# Patient Record
Sex: MALE | Race: WHITE | NOT HISPANIC OR LATINO | Employment: UNEMPLOYED | URBAN - METROPOLITAN AREA
[De-identification: names, ages, dates, MRNs, and addresses within clinical notes are randomized per-mention and may not be internally consistent; named-entity substitution may affect disease eponyms.]

---

## 2019-04-04 ENCOUNTER — HOSPITAL ENCOUNTER (OUTPATIENT)
Dept: OTHER | Facility: HOSPITAL | Age: 39
Discharge: HOME | End: 2019-04-04
Attending: NURSE PRACTITIONER

## 2019-12-18 ENCOUNTER — HOSPITAL ENCOUNTER (OUTPATIENT)
Dept: OTHER | Facility: HOSPITAL | Age: 39
Discharge: HOME | End: 2019-12-18
Attending: FAMILY MEDICINE

## 2020-07-07 ENCOUNTER — OFFICE VISIT CONVERTED (OUTPATIENT)
Dept: GASTROENTEROLOGY | Facility: CLINIC | Age: 40
End: 2020-07-07
Attending: NURSE PRACTITIONER

## 2020-07-14 ENCOUNTER — HOSPITAL ENCOUNTER (OUTPATIENT)
Dept: ULTRASOUND IMAGING | Facility: HOSPITAL | Age: 40
Discharge: HOME | End: 2020-07-14
Attending: NURSE PRACTITIONER

## 2020-07-14 LAB
ALBUMIN SERPL-MCNC: 4.2 G/DL (ref 3.5–5)
ALBUMIN/GLOB SERPL: 1.4 {RATIO} (ref 1.4–2.6)
ALP SERPL-CCNC: 81 U/L (ref 53–128)
ALT SERPL-CCNC: 63 U/L (ref 10–40)
ANION GAP SERPL CALC-SCNC: 16 MMOL/L (ref 8–19)
AST SERPL-CCNC: 46 U/L (ref 15–50)
BILIRUB SERPL-MCNC: 0.91 MG/DL (ref 0.2–1.3)
BUN SERPL-MCNC: 14 MG/DL (ref 5–25)
BUN/CREAT SERPL: 16 {RATIO} (ref 6–20)
CALCIUM SERPL-MCNC: 9.5 MG/DL (ref 8.7–10.4)
CHLORIDE SERPL-SCNC: 105 MMOL/L (ref 99–111)
CONV CO2: 23 MMOL/L (ref 22–32)
CONV TOTAL PROTEIN: 7.1 G/DL (ref 6.3–8.2)
CREAT UR-MCNC: 0.89 MG/DL (ref 0.7–1.2)
FERRITIN SERPL-MCNC: 452 NG/ML (ref 30–300)
GFR SERPLBLD BASED ON 1.73 SQ M-ARVRAT: >60 ML/MIN/{1.73_M2}
GLOBULIN UR ELPH-MCNC: 2.9 G/DL (ref 2–3.5)
GLUCOSE SERPL-MCNC: 91 MG/DL (ref 70–99)
INR PPP: 0.99 (ref 2–3)
IRON SATN MFR SERPL: 29 % (ref 20–55)
IRON SERPL-MCNC: 93 UG/DL (ref 70–180)
OSMOLALITY SERPL CALC.SUM OF ELEC: 290 MOSM/KG (ref 273–304)
POTASSIUM SERPL-SCNC: 4.2 MMOL/L (ref 3.5–5.3)
PROTHROMBIN TIME: 10.7 S (ref 9.4–12)
SODIUM SERPL-SCNC: 140 MMOL/L (ref 135–147)
TIBC SERPL-MCNC: 317 UG/DL (ref 245–450)
TRANSFERRIN SERPL-MCNC: 222 MG/DL (ref 215–365)

## 2020-07-15 LAB
CONV HEPATITIS B SURFACE AG W CONFIRMATION RE: NEGATIVE
CONV IMMUNOGLOBULIN G (IGG): 1022 MG/DL (ref 603–1613)
CONV IMMUNOGLOBULIN M (IGM): 97 MG/DL (ref 20–172)
DEPRECATED MITOCHONDRIA M2 IGG SER-ACNC: <20 UNITS (ref 0–20)
HAV IGM SERPL QL IA: NEGATIVE
HBV CORE IGM SERPL QL IA: NEGATIVE
HCV AB SER DONR QL: <0.1 S/CO RATIO (ref 0–0.9)
IGA SERPL-MCNC: 400 MG/DL (ref 90–386)
PROT PATTERN SERPL IFE-IMP: ABNORMAL
SMOOTH MUSCLE F-ACTIN AB IGG: 10 UNITS (ref 0–19)

## 2020-07-16 LAB
DSDNA AB SER-ACNC: NEGATIVE [IU]/ML
ENA AB SER IA-ACNC: NEGATIVE {RATIO}

## 2020-07-17 LAB
A1AT SERPL-MCNC: 154 MG/DL (ref 95–164)
CONV NASH FIBROSURE: NORMAL
PHENOTYPE: NORMAL

## 2020-10-07 ENCOUNTER — OFFICE VISIT CONVERTED (OUTPATIENT)
Dept: GASTROENTEROLOGY | Facility: CLINIC | Age: 40
End: 2020-10-07
Attending: NURSE PRACTITIONER

## 2021-05-10 NOTE — H&P
History and Physical      Patient Name: Stepan Jurado   Patient ID: [id number]   Sex: Male   YOB: 1980    Primary Care Provider: Jesi Villavicencio   Referring Provider: Jesi Villavicencio    Visit Date: July 7, 2020    Provider: TRISTAN Olguin   Location: [facility]   Location Address: [address] [place] [address], [address]  [place], [place]  [id number]   Location Phone: [phone]          Chief Complaint  · Elevated LFTs      History Of Present Illness  The patient is a 39 year old male who presents on referral from Jesi Villavicencio for a gastroenterology evaluation.      Patient admits to drinking 4-5 beers nightly for the last 5 years. Denies unprofessional tattoos, hx of iv/intranasal drug use, or blood transfusion.     Denies abdominal pain, nausea, vomiting, change in appetite, or weight loss.     CMP 7/1/2020: GFR greater than 60, total bilirubin 1.1, alkaline phosphatase 87, AST 73, .  CBC 7/1/2020: WBC 8.2, hemoglobin 16.4, hematocrit 46.8, platelets 180.       Medication List  carbidopa-levodopa  mg oral tablet; cyclobenzaprine 10 mg oral tablet; losartan 50 mg oral tablet; meloxicam 15 mg oral tablet; ropinirole 0.5 mg oral tablet         Allergy List  NO KNOWN DRUG ALLERGIES       Allergies Reconciled  Family Medical History  No family history of colorectal cancer         Social History  Alcohol (Current every day); Tobacco (Current every day)         Review of Systems  · Constitutional  o Denies  o : chills, fever  · Eyes  o Denies  o : blurred vision, changes in vision  · Cardiovascular  o Denies  o : chest pain, syncope  · Respiratory  o Denies  o : shortness of breath, dry cough  · Gastrointestinal  o Admits  o : See HPI  · Genitourinary  o Denies  o : dysuria, blood in urine  · Integument  o Denies  o : rash, new skin lesions  · Neurologic  o Denies  o : altered mental status, tingling or numbness  · Musculoskeletal  o Denies  o : joint pain,  limitation of motion  · Endocrine  o Denies  o : weight gain, weight loss  · Psychiatric  o Denies  o : anxiety, depression      Physical Examination  · Constitutional  o Appearance  o : well developed, well-nourished, in no acute distress  · Eyes  o Vision  o :   § Visual Fields  § : eyes move symmetrical in all directions  o Sclerae  o : anicteric  o Pupils and Irises  o : pupils equal and symmetrical  · Neck  o Inspection/Palpation  o : supple  · Respiratory  o Respiratory Effort  o : breathing unlabored  o Inspection of Chest  o : normal appearance, no retractions  o Auscultation of Lungs  o : clear to auscultation bilaterally  · Cardiovascular  o Heart  o :   § Auscultation of Heart  § : no murmurs, gallops or rubs  · Gastrointestinal  o Abdominal Examination  o : soft, nontender to palpation, with normal active bowel sounds, no appreciable hepatosplenomegaly  o Digital Rectal Exam  o : deferred  · Lymphatic  o Neck  o : no palpable lymphadenopathy  · Skin and Subcutaneous Tissue  o General Inspection  o : without focal lesions; turgor is normal  · Psychiatric  o General  o : Alert and oriented x3  o Mood and Affect  o : Mood and affect are appropriate to circumstances          Assessment  · Elevated liver enzymes     790.5/R74.8    Problems Reconciled  Plan  · Orders  o CMP [facility] (70528) - - 07/07/2020  o RUQ US (right upper quadrant ultrasound) (51706) - - 07/07/2020  o Iron + transferrin (TIBC, calculated % saturation) (39603) - - 07/07/2020  o Ferritin ser/plas (37745) - - 07/07/2020  o ANTONIO (antinuclear antibody profile) by enzyme immunoassay (46292) - - 07/07/2020  o Anti-mitochondrial antibody assay (39386) - - 07/07/2020  o Anti-Smooth Muscle Antibody (ASMA) [facility] (57452) - - 07/07/2020  o Serum immunofixation electrophoresis (42363) - - 07/07/2020  o Alpha-1-Antitrypsin/Phenotype [facility] (31777, 94843) - - 07/07/2020  o PT (17924) - - 07/07/2020  o Acute hepatitis panel (HAV IgM, HbcAb IgM, HbsAg, HCV) (59262,  98339, 78198, 80585, 46176) - - 07/07/2020  o BAPTISTE Fibrosure [facility] (drawn at [facility] only and must be fasting 8 hours; requires HT and WT) (03113, 41577, 88204, 24732, 47481, 27962, 49986, 18525, 70194, 48185) - - 07/07/2020  · Medications  o Medications have been Reconciled  o Transition of Care or Provider Policy            Electronically Signed by: TRISTAN Olguin -Author on July 7, 2020 08:28:06 AM

## 2021-05-13 NOTE — PROGRESS NOTES
Progress Note      Patient Name: Stepan Jurado   Patient ID: [id number]   Sex: Male   YOB: 1980    Primary Care Provider: Jesi Villavicencio   Referring Provider: Jesi Villavicencio    Visit Date: October 7, 2020    Provider: TRISTAN Olguin   Location: [facility]   Location Address: [address] [place] [address], [address]  [place], [place]  [id number]   Location Phone: [phone]          Chief Complaint  · Follow up Elevated LFTs      History Of Present Illness     Patient presents today for follow-up liver work-up.  Fibro-sure revealed mild fatty liver and stage 1-2 fibrosis. He did also have an elevated ferritin, however has yet to obtain HFE gene. Iron levels normal.   Patient states that he is still drinking several beers throughout the week but understands he needs to quit.      He denies any abdominal pain, vomiting, change in appetite, or weight loss.     Andrade fibro-sure 7/7/2020: F1 to 2, S2, N1.    7/14/2020: GFR greater than 20, alkaline phosphatase 81, ALT 63, AST 46, total bilirubin 0.91.                Medication List  carbidopa-levodopa  mg oral tablet; cyclobenzaprine 10 mg oral tablet; losartan 50 mg oral tablet; meloxicam 15 mg oral tablet; ropinirole 0.5 mg oral tablet         Allergy List  NO KNOWN DRUG ALLERGIES       Allergies Reconciled  Family Medical History  No family history of colorectal cancer         Social History  Alcohol (Current every day); Tobacco (Current every day)         Review of Systems  · Constitutional  o Denies  o : chills, fever  · Cardiovascular  o Denies  o : chest pain, dyspnea on exertion  · Respiratory  o Denies  o : cough, shortness of breath  · Gastrointestinal  o Admits  o : see HPI   · Endocrine  o Denies  o : weight gain, weight loss      Vitals  Date Time BP Position Site L\R Cuff Size HR RR TEMP (F) WT  HT  BMI kg/m2 BSA m2 O2 Sat FR L/min FiO2 HC       10/07/2020 08:23 /79 Sitting    94 - R   155lbs 0oz  "5'  7\" 24.28 1.82             Physical Examination  · Constitutional  o Appearance  o : Healthy-appearing, awake and alert in no acute distress  · Head and Face  o Head  o : Normocephalic with no worriesome skin lesions  · Eyes  o Vision  o :   § Visual Fields  § : eyes move symmetrical in all directions  o Sclerae  o : sclerae anicteric  o Pupils and Irises  o : pupils equal and symmetrical  · Neck  o Inspection/Palpation  o : Trachea is midline, no adenopathy  · Respiratory  o Respiratory Effort  o : Breathing is unlabored.  o Inspection of Chest  o : normal appearance  o Auscultation of Lungs  o : Chest is clear to auscultation bilaterally.  · Cardiovascular  o Heart  o :   § Auscultation of Heart  § : no murmurs, rubs, or gallops  o Peripheral Vascular System  o :   § Extremities  § : no cyanosis, clubbing or edema;   · Gastrointestinal  o Abdominal Examination  o : Abdomen is soft, nontender to palpation, with normal active bowel sounds, no appreciable hepatosplenomegaly.  o Digital Rectal Exam  o : deferred  · Skin and Subcutaneous Tissue  o General Inspection  o : without focal lesions; turgor is normal  · Psychiatric  o General  o : Alert and oriented x3  o Mood and Affect  o : Mood and affect are appropriate to circumstances          Assessment  · Fatty liver     571.8/K76.0  · Liver fibrosis     571.5/K74.0  · Elevated ferritin     790.6/R79.89      Plan  · Orders  o HFE gene C282Y mutation detection (87320) - - 10/07/2020  · Medications  o Medications have been Reconciled  o Transition of Care or Provider Policy  · Instructions  o Information given on current diagnoses.  o Lifestyle modifications discussed.  o Liver work-up consistent with mild fatty liver and grade 1-2 stiffening. Educated patient that continuing to drink alcohol will increase likelihood of liver stiffening to advance. Educated him about alcohol cessation. Offered him resources for alcohol cessation however he declined at this time and " stated he did not need it.  o Will follow-up in 1 year with a liver ultrasound to make sure stiffening is not advanced. However encouraged patient to please call office if he is having any abdominal pain, abdominal bloating/swelling, rectal bleeding, etc.  o Electronically Identified Patient Education Materials Provided Electronically  · Disposition  o 1 year f/u            Electronically Signed by: TRISTAN Olguin -Author on October 7, 2020 08:47:09 AM

## 2021-05-14 VITALS
WEIGHT: 155 LBS | BODY MASS INDEX: 24.33 KG/M2 | DIASTOLIC BLOOD PRESSURE: 79 MMHG | HEART RATE: 94 BPM | HEIGHT: 67 IN | SYSTOLIC BLOOD PRESSURE: 123 MMHG

## 2021-05-15 VITALS
DIASTOLIC BLOOD PRESSURE: 74 MMHG | HEART RATE: 91 BPM | TEMPERATURE: 98.2 F | WEIGHT: 156.25 LBS | HEIGHT: 67 IN | SYSTOLIC BLOOD PRESSURE: 126 MMHG | BODY MASS INDEX: 24.52 KG/M2

## 2024-07-19 LAB
BASOPHILS # BLD AUTO: 0.1 10*3/MM3 (ref 0–0.2)
BASOPHILS NFR BLD AUTO: 0.8 % (ref 0–1.5)
DEPRECATED RDW RBC AUTO: 45.1 FL (ref 37–54)
EOSINOPHIL # BLD AUTO: 0.44 10*3/MM3 (ref 0–0.4)
EOSINOPHIL NFR BLD AUTO: 3.4 % (ref 0.3–6.2)
ERYTHROCYTE [DISTWIDTH] IN BLOOD BY AUTOMATED COUNT: 12.7 % (ref 12.3–15.4)
HCT VFR BLD AUTO: 47.4 % (ref 37.5–51)
HGB BLD-MCNC: 17.2 G/DL (ref 13–17.7)
HOLD SPECIMEN: NORMAL
IMM GRANULOCYTES # BLD AUTO: 0.05 10*3/MM3 (ref 0–0.05)
IMM GRANULOCYTES NFR BLD AUTO: 0.4 % (ref 0–0.5)
LYMPHOCYTES # BLD AUTO: 3.65 10*3/MM3 (ref 0.7–3.1)
LYMPHOCYTES NFR BLD AUTO: 28.2 % (ref 19.6–45.3)
MCH RBC QN AUTO: 34.9 PG (ref 26.6–33)
MCHC RBC AUTO-ENTMCNC: 36.3 G/DL (ref 31.5–35.7)
MCV RBC AUTO: 96.1 FL (ref 79–97)
MONOCYTES # BLD AUTO: 0.86 10*3/MM3 (ref 0.1–0.9)
MONOCYTES NFR BLD AUTO: 6.6 % (ref 5–12)
NEUTROPHILS NFR BLD AUTO: 60.6 % (ref 42.7–76)
NEUTROPHILS NFR BLD AUTO: 7.85 10*3/MM3 (ref 1.7–7)
NRBC BLD AUTO-RTO: 0 /100 WBC (ref 0–0.2)
PLATELET # BLD AUTO: 213 10*3/MM3 (ref 140–450)
PMV BLD AUTO: 9.6 FL (ref 6–12)
RBC # BLD AUTO: 4.93 10*6/MM3 (ref 4.14–5.8)
WBC NRBC COR # BLD AUTO: 12.95 10*3/MM3 (ref 3.4–10.8)

## 2024-07-19 PROCEDURE — 80053 COMPREHEN METABOLIC PANEL: CPT

## 2024-07-19 PROCEDURE — 36415 COLL VENOUS BLD VENIPUNCTURE: CPT

## 2024-07-19 PROCEDURE — 85610 PROTHROMBIN TIME: CPT

## 2024-07-19 PROCEDURE — 85384 FIBRINOGEN ACTIVITY: CPT

## 2024-07-19 PROCEDURE — 99283 EMERGENCY DEPT VISIT LOW MDM: CPT

## 2024-07-19 PROCEDURE — 85025 COMPLETE CBC W/AUTO DIFF WBC: CPT

## 2024-07-19 PROCEDURE — 85730 THROMBOPLASTIN TIME PARTIAL: CPT

## 2024-07-20 ENCOUNTER — HOSPITAL ENCOUNTER (EMERGENCY)
Facility: HOSPITAL | Age: 44
Discharge: HOME | End: 2024-07-20
Attending: EMERGENCY MEDICINE
Payer: COMMERCIAL

## 2024-07-20 VITALS
HEIGHT: 67 IN | RESPIRATION RATE: 20 BRPM | OXYGEN SATURATION: 95 % | HEART RATE: 93 BPM | BODY MASS INDEX: 25.74 KG/M2 | DIASTOLIC BLOOD PRESSURE: 91 MMHG | TEMPERATURE: 97.5 F | WEIGHT: 164.02 LBS | SYSTOLIC BLOOD PRESSURE: 114 MMHG
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
## 2024-07-20 LAB
ALBUMIN SERPL-MCNC: 4.2 G/DL (ref 3.5–5.2)
ALBUMIN SERPL-MCNC: 4.3 G/DL (ref 3.5–5.2)
ALBUMIN/GLOB SERPL: 1.4 G/DL
ALBUMIN/GLOB SERPL: 1.5 G/DL
ALP SERPL-CCNC: 108 U/L (ref 39–117)
ALP SERPL-CCNC: 91 U/L (ref 39–117)
ALT SERPL W P-5'-P-CCNC: 32 U/L (ref 1–41)
ALT SERPL W P-5'-P-CCNC: 86 U/L (ref 1–41)
ANION GAP SERPL CALCULATED.3IONS-SCNC: 10.8 MMOL/L (ref 5–15)
ANION GAP SERPL CALCULATED.3IONS-SCNC: 12.4 MMOL/L (ref 5–15)
APTT PPP: 30.6 SECONDS (ref 78–95.9)
APTT PPP: 31.4 SECONDS (ref 78–95.9)
AST SERPL-CCNC: 45 U/L (ref 1–40)
AST SERPL-CCNC: 45 U/L (ref 1–40)
BASOPHILS # BLD AUTO: 0.07 10*3/MM3 (ref 0–0.2)
BASOPHILS NFR BLD AUTO: 0.7 % (ref 0–1.5)
BILIRUB SERPL-MCNC: 0.4 MG/DL (ref 0–1.2)
BILIRUB SERPL-MCNC: 0.5 MG/DL (ref 0–1.2)
BUN SERPL-MCNC: 10 MG/DL (ref 6–20)
BUN SERPL-MCNC: 9 MG/DL (ref 6–20)
BUN/CREAT SERPL: 10 (ref 7–25)
BUN/CREAT SERPL: 12.3 (ref 7–25)
CALCIUM SPEC-SCNC: 9.8 MG/DL (ref 8.6–10.5)
CALCIUM SPEC-SCNC: 9.9 MG/DL (ref 8.6–10.5)
CHLORIDE SERPL-SCNC: 100 MMOL/L (ref 98–107)
CHLORIDE SERPL-SCNC: 98 MMOL/L (ref 98–107)
CO2 SERPL-SCNC: 24.6 MMOL/L (ref 22–29)
CO2 SERPL-SCNC: 27.2 MMOL/L (ref 22–29)
CREAT SERPL-MCNC: 0.81 MG/DL (ref 0.76–1.27)
CREAT SERPL-MCNC: 0.9 MG/DL (ref 0.76–1.27)
DEPRECATED RDW RBC AUTO: 44.1 FL (ref 37–54)
EGFRCR SERPLBLD CKD-EPI 2021: 109.4 ML/MIN/1.73
EGFRCR SERPLBLD CKD-EPI 2021: 112.9 ML/MIN/1.73
EOSINOPHIL # BLD AUTO: 0.4 10*3/MM3 (ref 0–0.4)
EOSINOPHIL NFR BLD AUTO: 4 % (ref 0.3–6.2)
ERYTHROCYTE [DISTWIDTH] IN BLOOD BY AUTOMATED COUNT: 12.6 % (ref 12.3–15.4)
FIBRINOGEN PPP-MCNC: 380 MG/DL (ref 215–521)
FIBRINOGEN PPP-MCNC: 394 MG/DL (ref 215–521)
GLOBULIN UR ELPH-MCNC: 2.8 GM/DL
GLOBULIN UR ELPH-MCNC: 3.1 GM/DL
GLUCOSE SERPL-MCNC: 83 MG/DL (ref 65–99)
GLUCOSE SERPL-MCNC: 97 MG/DL (ref 65–99)
HCT VFR BLD AUTO: 45.5 % (ref 37.5–51)
HGB BLD-MCNC: 16.6 G/DL (ref 13–17.7)
HOLD SPECIMEN: NORMAL
HOLD SPECIMEN: NORMAL
IMM GRANULOCYTES # BLD AUTO: 0.03 10*3/MM3 (ref 0–0.05)
IMM GRANULOCYTES NFR BLD AUTO: 0.3 % (ref 0–0.5)
INR PPP: 0.99 (ref 0.86–1.15)
INR PPP: 1 (ref 0.86–1.15)
LYMPHOCYTES # BLD AUTO: 2.85 10*3/MM3 (ref 0.7–3.1)
LYMPHOCYTES NFR BLD AUTO: 28.4 % (ref 19.6–45.3)
MCH RBC QN AUTO: 34.7 PG (ref 26.6–33)
MCHC RBC AUTO-ENTMCNC: 36.5 G/DL (ref 31.5–35.7)
MCV RBC AUTO: 95.2 FL (ref 79–97)
MONOCYTES # BLD AUTO: 0.61 10*3/MM3 (ref 0.1–0.9)
MONOCYTES NFR BLD AUTO: 6.1 % (ref 5–12)
NEUTROPHILS NFR BLD AUTO: 6.07 10*3/MM3 (ref 1.7–7)
NEUTROPHILS NFR BLD AUTO: 60.5 % (ref 42.7–76)
NRBC BLD AUTO-RTO: 0 /100 WBC (ref 0–0.2)
PLATELET # BLD AUTO: 209 10*3/MM3 (ref 140–450)
PMV BLD AUTO: 9.4 FL (ref 6–12)
POTASSIUM SERPL-SCNC: 3.8 MMOL/L (ref 3.5–5.2)
POTASSIUM SERPL-SCNC: 4.1 MMOL/L (ref 3.5–5.2)
PROT SERPL-MCNC: 7 G/DL (ref 6–8.5)
PROT SERPL-MCNC: 7.4 G/DL (ref 6–8.5)
PROTHROMBIN TIME: 13.3 SECONDS (ref 11.8–14.9)
PROTHROMBIN TIME: 13.4 SECONDS (ref 11.8–14.9)
RBC # BLD AUTO: 4.78 10*6/MM3 (ref 4.14–5.8)
SODIUM SERPL-SCNC: 135 MMOL/L (ref 136–145)
SODIUM SERPL-SCNC: 138 MMOL/L (ref 136–145)
WBC NRBC COR # BLD AUTO: 10.03 10*3/MM3 (ref 3.4–10.8)
WHOLE BLOOD HOLD COAG: NORMAL
WHOLE BLOOD HOLD COAG: NORMAL
WHOLE BLOOD HOLD SPECIMEN: NORMAL

## 2024-07-20 PROCEDURE — 25010000002 TETANUS-DIPHTH-ACELL PERTUSSIS 5-2.5-18.5 LF-MCG/0.5 SUSPENSION PREFILLED SYRINGE: Performed by: EMERGENCY MEDICINE

## 2024-07-20 PROCEDURE — 85610 PROTHROMBIN TIME: CPT | Performed by: EMERGENCY MEDICINE

## 2024-07-20 PROCEDURE — 90471 IMMUNIZATION ADMIN: CPT | Performed by: EMERGENCY MEDICINE

## 2024-07-20 PROCEDURE — 85384 FIBRINOGEN ACTIVITY: CPT | Performed by: EMERGENCY MEDICINE

## 2024-07-20 PROCEDURE — 90715 TDAP VACCINE 7 YRS/> IM: CPT | Performed by: EMERGENCY MEDICINE

## 2024-07-20 PROCEDURE — 85730 THROMBOPLASTIN TIME PARTIAL: CPT | Performed by: EMERGENCY MEDICINE

## 2024-07-20 PROCEDURE — 85025 COMPLETE CBC W/AUTO DIFF WBC: CPT | Performed by: EMERGENCY MEDICINE

## 2024-07-20 PROCEDURE — 80053 COMPREHEN METABOLIC PANEL: CPT | Performed by: EMERGENCY MEDICINE

## 2024-07-20 RX ADMIN — TETANUS TOXOID, REDUCED DIPHTHERIA TOXOID AND ACELLULAR PERTUSSIS VACCINE, ADSORBED 0.5 ML: 5; 2.5; 8; 8; 2.5 SUSPENSION INTRAMUSCULAR at 00:58

## 2024-07-20 NOTE — ED NOTES
Poison control notified, following orders carried out:  -elevate extremity  -warm compress  -karolyn effected area  -repeat CBC,CMP,and Clotting labs at 0230    Po[place] control is currently evaluating the need for crowfab and will be calling back.

## 2024-07-20 NOTE — ED PROVIDER NOTES
"Time: 11:36 PM EDT  Date of encounter:  7/19/2024  Independent Historian/Clinical History and Information was obtained by:   Patient    History is limited by: N/A    Chief Complaint: Snakebite      History of Present Illness:  Patient is a 43 y.o. year old male who presents to the emergency department for evaluation of snakebite to right lower leg an hour PTA.  Patient states it was a copperhead.  Not up-to-date on tetanus.      Patient states that he is fairly confident it was a copperhead.  He saw it clearly after the encounter.  He was walking on his driveway when it struck him.  He has 2 small puncture wounds to his right lateral lower leg.  He had no treatment prior to arrival.  He states he has no pain at this time.    HPI    Patient Care Team  Primary Care Provider: Clifton Muro MD    Past Medical History:     No Known Allergies  No past medical history on file.  No past surgical history on file.  No family history on file.    Home Medications:  Prior to Admission medications    Not on File        Social History:   Social History     Tobacco Use    Smoking status: Every Day   Substance Use Topics    Alcohol use: Yes         Review of Systems:  Review of Systems   Constitutional:  Negative for chills and fever.   HENT:  Negative for congestion, ear pain and sore throat.    Eyes:  Negative for pain.   Respiratory:  Negative for cough, chest tightness and shortness of breath.    Cardiovascular:  Negative for chest pain.   Gastrointestinal:  Negative for abdominal pain, diarrhea, nausea and vomiting.   Genitourinary:  Negative for flank pain and hematuria.   Musculoskeletal:  Negative for joint swelling.   Skin:  Positive for wound. Negative for pallor.   Neurological:  Negative for seizures and headaches.   All other systems reviewed and are negative.       Physical Exam:  /91   Pulse 93   Temp 97.5 °F (36.4 °C) (Oral)   Resp 20   Ht 170.2 cm (67\")   Wt 74.4 kg (164 lb 0.4 oz)   SpO2 95%   BMI " 25.69 kg/m²     Physical Exam  Vitals and nursing note reviewed.   Constitutional:       General: He is not in acute distress.     Appearance: Normal appearance. He is not toxic-appearing.   HENT:      Head: Normocephalic and atraumatic.      Jaw: There is normal jaw occlusion.   Eyes:      General: Lids are normal.      Extraocular Movements: Extraocular movements intact.      Conjunctiva/sclera: Conjunctivae normal.      Pupils: Pupils are equal, round, and reactive to light.   Cardiovascular:      Rate and Rhythm: Normal rate and regular rhythm.      Pulses: Normal pulses.      Heart sounds: Normal heart sounds.   Pulmonary:      Effort: Pulmonary effort is normal. No respiratory distress.      Breath sounds: Normal breath sounds. No wheezing or rhonchi.   Abdominal:      General: Abdomen is flat. There is no distension.      Palpations: Abdomen is soft.      Tenderness: There is no abdominal tenderness. There is no guarding or rebound.   Musculoskeletal:         General: Normal range of motion.      Cervical back: Normal range of motion and neck supple.      Right lower leg: No edema.      Left lower leg: No edema.   Skin:     General: Skin is warm and dry.      Coloration: Skin is not cyanotic.      Comments: 2 small punctate wounds to right lower lateral leg no surrounding skin changes   Neurological:      Mental Status: He is alert and oriented to person, place, and time. Mental status is at baseline.   Psychiatric:         Attention and Perception: Attention and perception normal.         Mood and Affect: Mood normal.          \  Procedures:  Procedures      Medical Decision Making:      Comorbidities that affect care:    None    External Notes reviewed:    None      The following orders were placed and all results were independently analyzed by me:  Orders Placed This Encounter   Procedures    Comprehensive Metabolic Panel    CBC Auto Differential    Protime-INR    aPTT    Fibrinogen    Comprehensive  Metabolic Panel    aPTT    [place] Draw    CBC Auto Differential    Protime-INR    Fibrinogen    Contact poison control    CBC & Differential    Extra Tubes    Gold Top - SST    Light Blue Top    CBC & Differential    Green Top (Gel)    Lavender Top    Gold Top - SST    Light Blue Top       Medications Given in the Emergency Department:  Medications   Tetanus-Diphth-Acell Pertussis (BOOSTRIX) injection 0.5 mL (0.5 mL Intramuscular Given 7/20/24 0058)        ED Course:    ED Course as of 07/20/24 0722   Fri Jul 19, 2024   2337   --- PROVIDER IN TRIAGE NOTE ---    The patient was evaluated by Jeronimo roman in triage. Orders were placed and the patient is currently awaiting disposition.    [AJ]   Sat Jul 20, 2024   0244 On reevaluation patient has had no changes in his injury site.  The small abrasion/puncture wounds over his right lower lateral leg showed no surrounding erythema edema or color change. [JS]   0328 I was able to discuss the patient's clinical course in the emergency department with poison control we reviewed the patient's 4-hour labs.  The patient has no evidence of coagulopathy.  He has no evidence of envenomation at the site of the wound.  Poison control feels comfortable signing off at this point. [JS]   0328 I believe the patient is safe for discharge home we discussed the importance of prompt return if he develops any evidence of injury at this site of the bite.  He and his significant other at bedside expressed understanding and agreement with plan.  We discussed return precautions including worsening symptoms or any additional concerns. [JS]      ED Course User Index  [AJ] Jeronimo Michel PA-C  [JS] Stepan Us MD       Labs:    Lab Results (last 24 hours)       Procedure Component Value Units Date/Time    CBC & Differential [[id number]]  (Abnormal) Collected: 07/19/24 2341    Specimen: Blood from Arm, Right Updated: 07/19/24 2347    Narrative:      The following orders were created  for panel order CBC & Differential.  Procedure                               Abnormality         Status                     ---------                               -----------         ------                     CBC Auto Differential[[id number]]        Abnormal            Final result                 Please view results for these tests on the individual orders.    Comprehensive Metabolic Panel [[id number]]  (Abnormal) Collected: 07/19/24 2341    Specimen: Blood from Arm, Right Updated: 07/20/24 0012     Glucose 83 mg/dL      BUN 9 mg/dL      Creatinine 0.90 mg/dL      Sodium 138 mmol/L      Potassium 4.1 mmol/L      Comment: Slight hemolysis detected by analyzer. Result may be falsely elevated.        Chloride 100 mmol/L      CO2 27.2 mmol/L      Calcium 9.8 mg/dL      Total Protein 7.4 g/dL      Albumin 4.3 g/dL      ALT (SGPT) 86 U/L      AST (SGOT) 45 U/L      Alkaline Phosphatase 108 U/L      Total Bilirubin 0.5 mg/dL      Globulin 3.1 gm/dL      A/G Ratio 1.4 g/dL      BUN/Creatinine Ratio 10.0     Anion Gap 10.8 mmol/L      eGFR 109.4 mL/min/1.73     Narrative:      GFR Normal >60  Chronic Kidney Disease <60  Kidney Failure <15      CBC Auto Differential [[id number]]  (Abnormal) Collected: 07/19/24 2341    Specimen: Blood from Arm, Right Updated: 07/19/24 2347     WBC 12.95 10*3/mm3      RBC 4.93 10*6/mm3      Hemoglobin 17.2 g/dL      Hematocrit 47.4 %      MCV 96.1 fL      MCH 34.9 pg      MCHC 36.3 g/dL      RDW 12.7 %      RDW-SD 45.1 fl      MPV 9.6 fL      Platelets 213 10*3/mm3      Neutrophil % 60.6 %      Lymphocyte % 28.2 %      Monocyte % 6.6 %      Eosinophil % 3.4 %      Basophil % 0.8 %      Immature Grans % 0.4 %      Neutrophils, Absolute 7.85 10*3/mm3      Lymphocytes, Absolute 3.65 10*3/mm3      Monocytes, Absolute 0.86 10*3/mm3      Eosinophils, Absolute 0.44 10*3/mm3      Basophils, Absolute 0.10 10*3/mm3      Immature Grans, Absolute 0.05 10*3/mm3      nRBC 0.0 /100 WBC     Protime-INR  [[id number]]  (Normal) Collected: 07/19/24 2341    Specimen: Blood from Arm, Right Updated: 07/20/24 0007     Protime 13.4 Seconds      INR 1.00    Narrative:      Suggested Therapeutic Ranges For Oral Anticoagulant Therapy:  Level of Therapy                      INR Target Range  Standard Dose                            2.0-3.0  High Dose                                2.5-3.5  Patients not receiving anticoagulant  Therapy Normal Range                     0.86-1.15    aPTT [[id number]]  (Abnormal) Collected: 07/19/24 2341    Specimen: Blood from Arm, Right Updated: 07/20/24 0007     PTT 31.4 seconds     Fibrinogen [[id number]]  (Normal) Collected: 07/19/24 2341    Specimen: Blood from Arm, Right Updated: 07/20/24 0007     Fibrinogen 394 mg/dL     CBC & Differential [[id number]]  (Abnormal) Collected: 07/20/24 0245    Specimen: Blood Updated: 07/20/24 0253    Narrative:      The following orders were created for panel order CBC & Differential.  Procedure                               Abnormality         Status                     ---------                               -----------         ------                     CBC Auto Differential[[id number]]        Abnormal            Final result                 Please view results for these tests on the individual orders.    Comprehensive Metabolic Panel [[id number]]  (Abnormal) Collected: 07/20/24 0245    Specimen: Blood Updated: 07/20/24 0316     Glucose 97 mg/dL      BUN 10 mg/dL      Creatinine 0.81 mg/dL      Sodium 135 mmol/L      Potassium 3.8 mmol/L      Chloride 98 mmol/L      CO2 24.6 mmol/L      Calcium 9.9 mg/dL      Total Protein 7.0 g/dL      Albumin 4.2 g/dL      ALT (SGPT) 32 U/L      AST (SGOT) 45 U/L      Alkaline Phosphatase 91 U/L      Total Bilirubin 0.4 mg/dL      Globulin 2.8 gm/dL      A/G Ratio 1.5 g/dL      BUN/Creatinine Ratio 12.3     Anion Gap 12.4 mmol/L      eGFR 112.9 mL/min/1.73     Narrative:      GFR Normal >60  Chronic Kidney Disease  <60  Kidney Failure <15      aPTT [[id number]]  (Abnormal) Collected: 07/20/24 0245    Specimen: Blood Updated: 07/20/24 0308     PTT 30.6 seconds     CBC Auto Differential [[id number]]  (Abnormal) Collected: 07/20/24 0245    Specimen: Blood Updated: 07/20/24 0253     WBC 10.03 10*3/mm3      RBC 4.78 10*6/mm3      Hemoglobin 16.6 g/dL      Hematocrit 45.5 %      MCV 95.2 fL      MCH 34.7 pg      MCHC 36.5 g/dL      RDW 12.6 %      RDW-SD 44.1 fl      MPV 9.4 fL      Platelets 209 10*3/mm3      Neutrophil % 60.5 %      Lymphocyte % 28.4 %      Monocyte % 6.1 %      Eosinophil % 4.0 %      Basophil % 0.7 %      Immature Grans % 0.3 %      Neutrophils, Absolute 6.07 10*3/mm3      Lymphocytes, Absolute 2.85 10*3/mm3      Monocytes, Absolute 0.61 10*3/mm3      Eosinophils, Absolute 0.40 10*3/mm3      Basophils, Absolute 0.07 10*3/mm3      Immature Grans, Absolute 0.03 10*3/mm3      nRBC 0.0 /100 WBC     Protime-INR [[id number]]  (Normal) Collected: 07/20/24 0245    Specimen: Blood Updated: 07/20/24 0308     Protime 13.3 Seconds      INR 0.99    Narrative:      Suggested Therapeutic Ranges For Oral Anticoagulant Therapy:  Level of Therapy                      INR Target Range  Standard Dose                            2.0-3.0  High Dose                                2.5-3.5  Patients not receiving anticoagulant  Therapy Normal Range                     0.86-1.15    Fibrinogen [[id number]]  (Normal) Collected: 07/20/24 0245    Specimen: Blood Updated: 07/20/24 0310     Fibrinogen 380 mg/dL              Imaging:    No Radiology Exams Resulted Within Past 24 Hours      Differential Diagnosis and Discussion:    Extremity Pain: Differential diagnosis includes but is not limited to soft tissue sprain, tendonitis, tendon injury, dislocation, fracture, deep vein thrombosis, arterial insufficiency, osteoarthritis, bursitis, and ligamentous damage.    All labs were reviewed and interpreted by me.    MDM     Amount and/or Complexity of  Data Reviewed  Decide to obtain previous medical records or to obtain history from someone other than the patient: yes         Critical Care Note: Total Critical Care time of 35 minutes. Total critical care time documented does not include time spent on separately billed procedures for services of nurses or physician assistants. I personally saw and examined the patient. I have reviewed all diagnostic interpretations and treatment plans as written. I was present for the key portions of any procedures performed and the inclusive time noted in any critical care statement. Critical care time includes patient management by me, time spent at the patients bedside,  time to review lab and imaging results, discussing patient care, documentation in the medical record, and time spent with family or caregiver.        Patient Care Considerations:    NARCOTICS: I considered prescribing opiate pain medication as an outpatient, however no pain control required in the emergency department.      Consultants/Shared Management Plan:    I discussed patient with poison control who believes the patient is safe for discharge home as he has no skin findings and no evidence of coagulopathy.    Social Determinants of Health:    Patient has presented with family members who are responsible, reliable and will ensure follow up care.      Disposition and Care Coordination:    Discharged: I considered escalation of care by admitting this patient to the hospital, however the patient has no skin findings and no laboratory findings consistent with coagulopathy and the patient is comfortable continue to monitor at home.    I have explained the patient´s condition, diagnoses and treatment plan based on the information available to me at this time. I have answered questions and addressed any concerns. The patient has a good  understanding of the patient´s diagnosis, condition, and treatment plan as can be expected at this point. The vital signs have  been stable. The patient´s condition is stable and appropriate for discharge from the emergency department.      The patient will pursue further outpatient evaluation with the primary care physician or other designated or consulting physician as outlined in the discharge instructions. They are agreeable to this plan of care and follow-up instructions have been explained in detail. The patient has received these instructions in written format and has expressed an understanding of the discharge instructions. The patient is aware that any significant change in condition or worsening of symptoms should prompt an immediate return to this or the closest emergency department or call to 911.    Final diagnoses:   Wound due to snake bite        ED Disposition       ED Disposition   Discharge    Condition   Stable    Comment   --               This medical record created using voice recognition software.             Stepan Us MD  07/20/24 5693